# Patient Record
Sex: MALE | Race: WHITE | NOT HISPANIC OR LATINO
[De-identification: names, ages, dates, MRNs, and addresses within clinical notes are randomized per-mention and may not be internally consistent; named-entity substitution may affect disease eponyms.]

---

## 2017-04-28 ENCOUNTER — APPOINTMENT (OUTPATIENT)
Dept: PULMONOLOGY | Facility: CLINIC | Age: 62
End: 2017-04-28

## 2017-04-28 VITALS
HEART RATE: 96 BPM | TEMPERATURE: 98 F | HEIGHT: 65 IN | SYSTOLIC BLOOD PRESSURE: 90 MMHG | BODY MASS INDEX: 16.86 KG/M2 | OXYGEN SATURATION: 99 % | DIASTOLIC BLOOD PRESSURE: 70 MMHG | WEIGHT: 101.2 LBS

## 2017-05-17 ENCOUNTER — APPOINTMENT (OUTPATIENT)
Dept: ORTHOPEDIC SURGERY | Facility: CLINIC | Age: 62
End: 2017-05-17

## 2017-05-19 ENCOUNTER — APPOINTMENT (OUTPATIENT)
Dept: INTERNAL MEDICINE | Facility: CLINIC | Age: 62
End: 2017-05-19

## 2017-05-19 VITALS
WEIGHT: 102 LBS | BODY MASS INDEX: 17 KG/M2 | HEART RATE: 102 BPM | DIASTOLIC BLOOD PRESSURE: 66 MMHG | SYSTOLIC BLOOD PRESSURE: 101 MMHG | HEIGHT: 65 IN | OXYGEN SATURATION: 98 % | RESPIRATION RATE: 14 BRPM | TEMPERATURE: 98.5 F

## 2017-05-19 DIAGNOSIS — R56.9 UNSPECIFIED CONVULSIONS: ICD-10-CM

## 2017-05-19 DIAGNOSIS — K21.9 GASTRO-ESOPHAGEAL REFLUX DISEASE W/OUT ESOPHAGITIS: ICD-10-CM

## 2017-08-03 ENCOUNTER — CHART COPY (OUTPATIENT)
Age: 62
End: 2017-08-03

## 2017-12-01 ENCOUNTER — MESSAGE (OUTPATIENT)
Age: 62
End: 2017-12-01

## 2017-12-15 ENCOUNTER — APPOINTMENT (OUTPATIENT)
Dept: PHYSICAL MEDICINE AND REHAB | Facility: CLINIC | Age: 62
End: 2017-12-15

## 2018-10-04 ENCOUNTER — APPOINTMENT (OUTPATIENT)
Dept: PULMONOLOGY | Facility: CLINIC | Age: 63
End: 2018-10-04
Payer: MEDICAID

## 2018-10-04 VITALS
BODY MASS INDEX: 17 KG/M2 | HEIGHT: 65 IN | OXYGEN SATURATION: 99 % | DIASTOLIC BLOOD PRESSURE: 62 MMHG | HEART RATE: 81 BPM | TEMPERATURE: 98 F | SYSTOLIC BLOOD PRESSURE: 93 MMHG | WEIGHT: 102 LBS

## 2018-10-04 PROCEDURE — 99214 OFFICE O/P EST MOD 30 MIN: CPT

## 2018-10-05 ENCOUNTER — OTHER (OUTPATIENT)
Age: 63
End: 2018-10-05

## 2018-10-29 ENCOUNTER — APPOINTMENT (OUTPATIENT)
Dept: SLEEP CENTER | Facility: HOSPITAL | Age: 63
End: 2018-10-29
Payer: MEDICAID

## 2018-10-30 ENCOUNTER — OUTPATIENT (OUTPATIENT)
Dept: OUTPATIENT SERVICES | Facility: HOSPITAL | Age: 63
LOS: 1 days | End: 2018-10-30
Payer: COMMERCIAL

## 2018-10-30 DIAGNOSIS — G47.33 OBSTRUCTIVE SLEEP APNEA (ADULT) (PEDIATRIC): ICD-10-CM

## 2018-10-30 PROCEDURE — 95810 POLYSOM 6/> YRS 4/> PARAM: CPT

## 2018-10-30 PROCEDURE — 95810 POLYSOM 6/> YRS 4/> PARAM: CPT | Mod: 26

## 2018-12-20 ENCOUNTER — APPOINTMENT (OUTPATIENT)
Dept: PULMONOLOGY | Facility: CLINIC | Age: 63
End: 2018-12-20
Payer: MEDICAID

## 2018-12-20 VITALS
BODY MASS INDEX: 17 KG/M2 | DIASTOLIC BLOOD PRESSURE: 55 MMHG | HEIGHT: 65 IN | OXYGEN SATURATION: 98 % | SYSTOLIC BLOOD PRESSURE: 92 MMHG | HEART RATE: 83 BPM | TEMPERATURE: 98.2 F | WEIGHT: 102 LBS

## 2018-12-20 DIAGNOSIS — G47.8 OTHER SLEEP DISORDERS: ICD-10-CM

## 2018-12-20 PROCEDURE — 99214 OFFICE O/P EST MOD 30 MIN: CPT

## 2018-12-21 PROBLEM — G47.8 NON-RESTORATIVE SLEEP: Status: ACTIVE | Noted: 2017-05-01

## 2018-12-28 ENCOUNTER — MOBILE ON CALL (OUTPATIENT)
Age: 63
End: 2018-12-28

## 2018-12-31 ENCOUNTER — APPOINTMENT (OUTPATIENT)
Dept: RHEUMATOLOGY | Facility: CLINIC | Age: 63
End: 2018-12-31
Payer: MEDICAID

## 2018-12-31 VITALS
SYSTOLIC BLOOD PRESSURE: 111 MMHG | HEART RATE: 83 BPM | BODY MASS INDEX: 17 KG/M2 | WEIGHT: 102 LBS | TEMPERATURE: 98.5 F | HEIGHT: 65 IN | DIASTOLIC BLOOD PRESSURE: 68 MMHG

## 2018-12-31 DIAGNOSIS — M62.838 OTHER MUSCLE SPASM: ICD-10-CM

## 2018-12-31 PROCEDURE — 99205 OFFICE O/P NEW HI 60 MIN: CPT

## 2018-12-31 RX ORDER — CYCLOBENZAPRINE HYDROCHLORIDE 5 MG/1
5 TABLET, FILM COATED ORAL
Qty: 30 | Refills: 0 | Status: COMPLETED | COMMUNITY
Start: 2017-04-19 | End: 2018-12-31

## 2019-01-07 ENCOUNTER — APPOINTMENT (OUTPATIENT)
Dept: RADIOLOGY | Facility: CLINIC | Age: 64
End: 2019-01-07
Payer: MEDICAID

## 2019-01-07 ENCOUNTER — OUTPATIENT (OUTPATIENT)
Dept: OUTPATIENT SERVICES | Facility: HOSPITAL | Age: 64
LOS: 1 days | End: 2019-01-07

## 2019-01-07 PROCEDURE — 73130 X-RAY EXAM OF HAND: CPT | Mod: 26,LT,RT

## 2019-01-07 PROCEDURE — 73110 X-RAY EXAM OF WRIST: CPT | Mod: 26,LT,RT

## 2019-01-07 PROCEDURE — 73030 X-RAY EXAM OF SHOULDER: CPT | Mod: 26,LT,RT

## 2019-01-14 ENCOUNTER — APPOINTMENT (OUTPATIENT)
Dept: RHEUMATOLOGY | Facility: CLINIC | Age: 64
End: 2019-01-14
Payer: MEDICAID

## 2019-01-14 VITALS
HEART RATE: 89 BPM | OXYGEN SATURATION: 100 % | BODY MASS INDEX: 17 KG/M2 | SYSTOLIC BLOOD PRESSURE: 89 MMHG | TEMPERATURE: 97.8 F | WEIGHT: 102 LBS | HEIGHT: 65 IN | DIASTOLIC BLOOD PRESSURE: 61 MMHG

## 2019-01-14 DIAGNOSIS — M35.3 POLYMYALGIA RHEUMATICA: ICD-10-CM

## 2019-01-14 DIAGNOSIS — M25.531 PAIN IN RIGHT WRIST: ICD-10-CM

## 2019-01-14 DIAGNOSIS — E55.9 VITAMIN D DEFICIENCY, UNSPECIFIED: ICD-10-CM

## 2019-01-14 DIAGNOSIS — M50.90 CERVICAL DISC DISORDER, UNSPECIFIED, UNSPECIFIED CERVICAL REGION: ICD-10-CM

## 2019-01-14 DIAGNOSIS — E78.5 HYPERLIPIDEMIA, UNSPECIFIED: ICD-10-CM

## 2019-01-14 PROCEDURE — 99214 OFFICE O/P EST MOD 30 MIN: CPT

## 2019-01-14 RX ORDER — ERGOCALCIFEROL 1.25 MG/1
1.25 MG CAPSULE ORAL
Qty: 4 | Refills: 4 | Status: ACTIVE | COMMUNITY
Start: 2019-01-14 | End: 1900-01-01

## 2019-01-14 NOTE — HISTORY OF PRESENT ILLNESS
[___ Week(s) Ago] : [unfilled] week(s) ago [FreeTextEntry1] : Follow up visit: 1/14/19 \par Has abnormal X-ray suggestive of inflammatory arthritis with erosions, likely psoriatic arthritis. \par Pt tried prednisone 20mg for 2 days only and self discontinued as did not tolerate for fatigue, previously reported upper ext pain/shoulder pain has improved on it's own. \par States he applies ? lotion for his psoriasis which is not helping. Has new Derm appt in 2 month. \par Few years ago he saw Rheumatologist, don't remember the location and place who recommended MTX for psoriatic arthritis. \par He had labs done on 01/8/19 with normal chem/LFTs. Mild anemia with Hg 13.2/38 \par CRP 9 ( <5), ESR 22 Vit D 22, UA normal \par Hep B , C and Tb quantiferon: negative \par Mg, Phos and TSH: WNL \par \par \par Initial visit: \par pt comes for Rheum evaluation, he was seen by multiple Neurologists internal sensation of intermittent  quivering  and  tremor of the torso for past few years, which is getting worse,  he was seen by multiple neurologist, has radiculopathy, no other specific diagnosis has been made. \par Had palpitation , negative  cardiac work up (stress test, echo and holter was told has been normal)\par Reports generalized pain when sleeping down,  had normal sleep studies, \par hx of rotator cuff:  treated with PT/OT , no surgery requires. \par his major problems is b/l arm pain, associated with random part twitching. \par Muscle rigidity, muscle locked sensation, his neck and upper extremities are painful, feels stiff and unable to lift up both arms above 90 degree due to pain. \par ROS: positive for psoriasis of multiple skin selin, including extensor surface of the elbows, knees , shins and trunk, above umbilicus, not on any psoriatic treatment. \par

## 2019-01-14 NOTE — DATA REVIEWED
[FreeTextEntry1] : b/l hand and wrist X-ray 1/07/19 \par There is again cortical indistinctness at the distal phalanges bases, most \par pronounced in the first digit bilaterally, suggestive of early erosive \par disease. This is not significantly changed since exam 9/25/2015.. There is \par joint space narrowing at the interphalangeal joint of the first digit on the \par left. No significant soft tissue swelling. No fracture. Alignment within \par normal limits. \par \par \par IMPRESSION: \par Mild/early erosive disease as described above, possibly reflecting an \par inflammatory arthropathy which could include psoriatic arthritis. \par \par \par 3 views of each wrist are submitted. There is interval progression of \par erosive changes at the ulnar styloid bilaterally and mild periostitis. There \par is again adjacent soft tissue swelling. Carpal alignment is within normal \par limits. No fracture. \par \par \par IMPRESSION: \par Interval progression of erosive changes of the ulnar styloid laterally since \par 9/25/2015. Again, these may be related to inflammatory arthritis. \par \par

## 2019-01-14 NOTE — REVIEW OF SYSTEMS
[Feeling Poorly] : feeling poorly [Feeling Tired] : feeling tired [Negative] : ENT [Fever] : no fever [Chills] : no chills [Eye Pain] : no eye pain [Red Eyes] : eyes not red [Eyesight Problems] : no eyesight problems [Shortness Of Breath] : no shortness of breath [Cough] : no cough [SOB on Exertion] : no shortness of breath during exertion [Abdominal Pain] : no abdominal pain [Vomiting] : no vomiting [Constipation] : no constipation [Diarrhea] : no diarrhea [Easy Bleeding] : no tendency for easy bleeding [Easy Bruising] : no tendency for easy bruising [Swollen Glands] : no swollen glands [de-identified] : active psoriasis  [de-identified] : tremor

## 2019-01-14 NOTE — ASSESSMENT
[FreeTextEntry1] : 64 y/o man with upper body pain and tremor for past few years, no specific diagnosis has been made, most recently he is experiencing pain and limited ROM of both upper extremities and worse cervical pain and stiffness, MRI 01/2018  osteophytes suggestive DJD and C6/C7 disc herniation,\par He has elevated inflammatory markers both ESR and CRP, mild normocytic anemia and repeat x-ray of hand and wrist with Interval progression of erosive changes of the ulnar styloid laterally and non significant erosive changes of digits, suspicious for psoriatic arthritis. \par On exam he has b/l prominent and tender ulnar styloid and possible R 2-3MCP synovitis. \par \par 1. Psoriatic arthritis: I had very detailed discussion about type of arthritis, progression of his erosions and irreversible damage he already had, no evidence of enthesitis or uveitis at present time, so would treat him with MTX , start 10mg po weekly and gradually uptitrate the dose.  \par MTX side effects discussed: \par Gastrointestinal problems, such as nausea, stomach upset, and loose stools\par Stomatitis or soreness of the mouth\par Abnormal liver chemistries, which are typically mild elevations in hepatic transaminases \par A macular punctate cutaneous eruption, which usually occurs on the extremities, often affecting the elbows and knees, but sparing the trunk\par Central nervous system symptoms, including headache, fatigue, malaise, or impaired ability to concentrate\par Alopecia\par Fever, which is drug-related, although fever can also occur due to infection \par Hematologic abnormalities, particularly macrocytosis, in addition to infrequent but severe myelosuppression \par MTX is an abortifacient that can also induce congenital anomalies if taken during pregnancy\par \par Pt is hesitant to start therapy and wants to discuss with his primary before he agrees. \par Also he has appt with Rehab and prefers to wait. \par I asked to bring if any new spine X-ray or MRI as if there is spondylitis 2/2 PsA he need to be treated with bioogics along with MTX. \par instructed to call me if any acute eye pain or redness. \par \par 2. Uncontrolled Psoriasis : pt applies some ointment ( unable to remember) , has dermatology appt in 2 month, since Dr. Agosto accepts his insurance I would let her see patient and  appt made for 01/17 to  start therapy for active psoriasis. \par 3. Muscle spasms: ck Mg, CK, Phospate, TFT all normal as well as Hep B, C and Tb quantiferon.\par 4. Vit D deficiency: Rx ergocalciferol 50.000IU sent to his pharmacy with refill.  \par 5. Called to PMD: Dr.Richard Russo [phone], he is on vacation, left all my call back numbers to discuss my plan of care, also will make sure pt is vaccinated for Flu, Pneumonia and zoster before starting MTX. \par \par f/u in 2 weeks

## 2019-01-14 NOTE — PHYSICAL EXAM
[General Appearance - Alert] : alert [Sclera] : the sclera and conjunctiva were normal [Extraocular Movements] : extraocular movements were intact [Hearing Threshold Finger Rub Not O'Brien] : hearing was normal [Oropharynx] : the oropharynx was normal [Neck Appearance] : the appearance of the neck was normal [Thyroid Nodule] : there were no palpable thyroid nodules [Respiration, Rhythm And Depth] : normal respiratory rhythm and effort [Auscultation Breath Sounds / Voice Sounds] : lungs were clear to auscultation bilaterally [Heart Rate And Rhythm] : heart rate was normal and rhythm regular [Heart Sounds] : normal S1 and S2 [Heart Sounds Gallop] : no gallops [Murmurs] : no murmurs [Heart Sounds Pericardial Friction Rub] : no pericardial rub [Edema] : there was no peripheral edema [Veins - Varicosity Changes] : there were no varicosital changes [Abdomen Soft] : soft [Abdomen Tenderness] : non-tender [] : no hepato-splenomegaly [No CVA Tenderness] : no ~M costovertebral angle tenderness [Abnormal Walk] : normal gait [Sensation] : the sensory exam was normal to light touch and pinprick [Motor Exam] : the motor exam was normal [Oriented To Time, Place, And Person] : oriented to person, place, and time [FreeTextEntry1] : no synovitis of any joints, b/l wrist with significant ulnar styloid and restricted ROM, mildy tender and swelling,1st MCP b/l tender, possible mild synovitis of 2-3 R MCP joint, no other joint tendernss or swelling.

## 2019-01-17 ENCOUNTER — APPOINTMENT (OUTPATIENT)
Dept: DERMATOLOGY | Facility: CLINIC | Age: 64
End: 2019-01-17
Payer: MEDICAID

## 2019-01-17 VITALS — DIASTOLIC BLOOD PRESSURE: 66 MMHG | SYSTOLIC BLOOD PRESSURE: 100 MMHG

## 2019-01-17 DIAGNOSIS — L98.9 DISORDER OF THE SKIN AND SUBCUTANEOUS TISSUE, UNSPECIFIED: ICD-10-CM

## 2019-01-17 DIAGNOSIS — Z91.89 OTHER SPECIFIED PERSONAL RISK FACTORS, NOT ELSEWHERE CLASSIFIED: ICD-10-CM

## 2019-01-17 PROCEDURE — 99203 OFFICE O/P NEW LOW 30 MIN: CPT

## 2019-01-17 NOTE — HISTORY OF PRESENT ILLNESS
[FreeTextEntry1] : psoriasis [de-identified] : 64 yo M referred by Dr. Asencio for psoriasis. Has had psoriasis on his skin for years. Treated with betamethasone and then calcipotriene which does not work as well. Has never treated with systemic agents but rheumatology has now recommended he start methotrexate for likely psoriatic arthritis. He is not sure if he wants to do this or if he should see orthopedic surgery. Reports occasional pain in wrists. Also with bumps on back and scalp that come and go.

## 2019-01-17 NOTE — PHYSICAL EXAM
[Alert] : alert [Oriented x 3] : ~L oriented x 3 [Well Nourished] : well nourished [Conjunctiva Non-injected] : conjunctiva non-injected [No Visual Lymphadenopathy] : no visual  lymphadenopathy [No Clubbing] : no clubbing [No Edema] : no edema [No Bromhidrosis] : no bromhidrosis [No Chromhidrosis] : no chromhidrosis [FreeTextEntry3] : well defined psoriatic plaques extensor elbows and knees and shins and umbilicus\par excoriations on upper back\par minimal scale in scalp, no papules

## 2019-01-17 NOTE — CONSULT LETTER
[Dear  ___] : Dear  [unfilled], [Consult Letter:] : I had the pleasure of evaluating your patient, [unfilled]. [Please see my note below.] : Please see my note below. [Consult Closing:] : Thank you very much for allowing me to participate in the care of this patient.  If you have any questions, please do not hesitate to contact me. [Sincerely,] : Sincerely, [FreeTextEntry3] : Veronica Agosto MD\par [facility]\par

## 2019-01-24 ENCOUNTER — APPOINTMENT (OUTPATIENT)
Dept: NEUROLOGY | Facility: CLINIC | Age: 64
End: 2019-01-24
Payer: MEDICAID

## 2019-01-24 VITALS
HEART RATE: 94 BPM | SYSTOLIC BLOOD PRESSURE: 96 MMHG | HEIGHT: 65 IN | TEMPERATURE: 97.8 F | WEIGHT: 99.5 LBS | BODY MASS INDEX: 16.58 KG/M2 | DIASTOLIC BLOOD PRESSURE: 59 MMHG | OXYGEN SATURATION: 100 %

## 2019-01-24 DIAGNOSIS — M79.601 PAIN IN RIGHT ARM: ICD-10-CM

## 2019-01-24 DIAGNOSIS — R20.0 ANESTHESIA OF SKIN: ICD-10-CM

## 2019-01-24 DIAGNOSIS — M79.602 PAIN IN RIGHT ARM: ICD-10-CM

## 2019-01-24 DIAGNOSIS — M62.830 MUSCLE SPASM OF BACK: ICD-10-CM

## 2019-01-24 PROCEDURE — 99215 OFFICE O/P EST HI 40 MIN: CPT

## 2019-01-28 ENCOUNTER — APPOINTMENT (OUTPATIENT)
Dept: RHEUMATOLOGY | Facility: CLINIC | Age: 64
End: 2019-01-28

## 2019-03-05 ENCOUNTER — APPOINTMENT (OUTPATIENT)
Dept: NEUROLOGY | Facility: CLINIC | Age: 64
End: 2019-03-05

## 2021-05-21 ENCOUNTER — APPOINTMENT (OUTPATIENT)
Dept: NEUROLOGY | Facility: CLINIC | Age: 66
End: 2021-05-21
Payer: MEDICAID

## 2021-05-21 VITALS
OXYGEN SATURATION: 98 % | WEIGHT: 99 LBS | TEMPERATURE: 98.1 F | SYSTOLIC BLOOD PRESSURE: 93 MMHG | HEIGHT: 65 IN | DIASTOLIC BLOOD PRESSURE: 60 MMHG | BODY MASS INDEX: 16.5 KG/M2 | HEART RATE: 84 BPM

## 2021-05-21 PROCEDURE — 99215 OFFICE O/P EST HI 40 MIN: CPT

## 2021-05-21 PROCEDURE — 99417 PROLNG OP E/M EACH 15 MIN: CPT

## 2021-05-21 RX ORDER — PREDNISONE 5 MG/1
5 TABLET ORAL
Qty: 120 | Refills: 0 | Status: DISCONTINUED | COMMUNITY
Start: 2018-12-31 | End: 2021-05-21

## 2021-05-21 RX ORDER — OMEPRAZOLE 20 MG/1
20 CAPSULE, DELAYED RELEASE ORAL DAILY
Qty: 30 | Refills: 3 | Status: DISCONTINUED | COMMUNITY
Start: 2017-04-19 | End: 2021-05-21

## 2021-05-21 NOTE — HISTORY OF PRESENT ILLNESS
[FreeTextEntry1] : Reason for consult: sensory disturbance\par \par HPI: TIAN CARROLL is a 66 year old man \par \par Pt reports he is here for "postural sensitivity". Laying down is uncomfortable - feels "uncomfortable", "soreness" and "pain" of muscles in the morning, pressure-like sensation in both arms, head, chest (saw cardiologist and "no issue with heart". Had EMG in 2018 read as "Active denervation seen at the C5-6 paraspinals only, suggesting possible radiculopathy, cannot further localize. Performer's Impression: Possibly very mild bilat median neuropathy afffecting only sensory fiber velocity. Also read as mild R ulnar demyelinating neuropathy at the elbow." MRI C spine had been done read as "C3/4 osteophytic ridge disc complex inc in size compared to prior, flattening ventral thecal sac, contib to severe R neuroforaminal narrowing. Mod L foraminal narrowing, C5/6 mild osteo ridge-disc complex , no flattening of thecal sac. Mild L foraminal narrowing, C6/7 mild ridge disc complex with superimposed paracentral disc herniation, no flattening of thecal sac. Mod foraminal narrowing bilat".\par Saw Dr. Perez for psoriatic arthritis, was recommended systemic therapy but never started it.\par Tried gabapentin but made him lethargic - he does not remember who prescribed this. \par Repeat EMG in 10/2020 read as definitive slowing of NC velocity of L ulnar nerve, acute and chronic denervation from bilateral c4-5-6-7 radiculopathies. \par Has seen pain in the remote past.\par \par ROS/Current Sx:\par 10 point ROS reviewed and scanned\par foggy head in AM\par poor sleep\par \par PMHX:\par psoriatic arthritis\par HLD\par cervical disc dz\par bursitis\par \par MEDS:\par omeprazole prn\par metoprolol prn\par lorazepam prn\par \par ALL: nkda\par \par SHx: NC\par \par FHx: NC\par \par Vitals:  mildly low BP, advised him to discuss with his PMD\par \par Exam:\par \par AO3.  Normally conversant.  Follows commands, names, and repeats.  Good attention.\par \par PERRL, VFF, EOMI, no nystagmus, face symmetric, TUP at midline.\par \par Motor: \par                                                 R:                               L:\par Del                                           5                                5\par Bi                                              5                               5\par Tri                                            5                               5\par Wrist Extensors                      5                               5\par Finger abductors                    5                               5\par                                         5                               5 \par \par HF                                           5                               5\par KE                                           5                               5\par KF                                           5                               5\par DF                                           5                               5\par PF                                           5                               5\par \par Tone                                       R                               L\par UE                                          0                                0 \par LE                                          0                                0\par \par Sensory                                RUE                      LUE                 RLE                LLE     \par LT                                           +                            +                      +                   +\par Vib                                          +                            +                      +                   +\par JPS                                         +                            +                      +                   +\par PP                                         +                            +                      +                   +\par Temp                                     +                            +                      +                   +\par \par Reflexes:\par                                              R                             L                            \par Biceps                                  2                             2\par BR                                        2                             2\par Triceps                                2                              2\par Pat                                        2                            2 \par AJ                                        2                             2\par \par TOES                                    F                            F\par \par \par Coordination:\par                                              R                             L                       \par FTN                                       0                             0 \par TERESO                                      0                            0\par HTS                                      0                             0 \par \par Other                                                                          \par  \par \par Gait: normal, can heel, toe, tandem\par \par                     Assistance: none\par \par \par AP: 65yo w/ c/o feeling uncomfortable while laying down, BL arm pain/pressure. History is a bit unclear. Regardless, I think that his pain is related to either cervical radiculopathy or, more likely, psoriatic arthritis. Neurological exam was entirely normal. Therefore, I do not believe that the patient has a primary neurological condition.\par \par all questions answered, education provided, management discussed at length.\par \par - f/u with ortho to discuss radiculopathy. he was interested in seeing spine specialist so referred. \par - f/u with rheum to discuss psoriatic arthritis\par - pain referral\par - agree with PT\par - RTC PRN\par \par \par

## 2021-08-20 ENCOUNTER — APPOINTMENT (OUTPATIENT)
Dept: ORTHOPEDIC SURGERY | Facility: CLINIC | Age: 66
End: 2021-08-20
Payer: MEDICAID

## 2021-08-20 VITALS — WEIGHT: 99 LBS | HEIGHT: 65 IN | RESPIRATION RATE: 16 BRPM | BODY MASS INDEX: 16.5 KG/M2

## 2021-08-20 PROCEDURE — 73140 X-RAY EXAM OF FINGER(S): CPT | Mod: RT,59

## 2021-08-20 PROCEDURE — 73130 X-RAY EXAM OF HAND: CPT | Mod: RT

## 2021-08-20 PROCEDURE — 99214 OFFICE O/P EST MOD 30 MIN: CPT

## 2021-08-20 PROCEDURE — 99204 OFFICE O/P NEW MOD 45 MIN: CPT

## 2021-09-02 ENCOUNTER — APPOINTMENT (OUTPATIENT)
Dept: ORTHOPEDIC SURGERY | Facility: CLINIC | Age: 66
End: 2021-09-02

## 2022-12-09 ENCOUNTER — APPOINTMENT (OUTPATIENT)
Dept: HEART AND VASCULAR | Facility: CLINIC | Age: 67
End: 2022-12-09

## 2022-12-09 ENCOUNTER — NON-APPOINTMENT (OUTPATIENT)
Age: 67
End: 2022-12-09

## 2022-12-09 VITALS
WEIGHT: 99 LBS | SYSTOLIC BLOOD PRESSURE: 108 MMHG | HEART RATE: 102 BPM | DIASTOLIC BLOOD PRESSURE: 70 MMHG | OXYGEN SATURATION: 100 % | BODY MASS INDEX: 16.5 KG/M2 | TEMPERATURE: 97.6 F | HEIGHT: 65 IN

## 2022-12-09 DIAGNOSIS — Z71.89 OTHER SPECIFIED COUNSELING: ICD-10-CM

## 2022-12-09 PROCEDURE — 93000 ELECTROCARDIOGRAM COMPLETE: CPT

## 2022-12-09 PROCEDURE — 99203 OFFICE O/P NEW LOW 30 MIN: CPT | Mod: 25

## 2022-12-09 NOTE — HISTORY OF PRESENT ILLNESS
[FreeTextEntry1] : Jenaro is a pleasant 67yr-old man who presents for an annual check in with cardiology. He previously saw a cardiologist in [place] who has transitioned to a different position. He wants to re-establish with a new care provider. He has previously seen Dr Rehman in our office in 2016 and has had above-mentioned cardiovascular work up which are all unremarkable.\par \par On review of his history, he denies having any chest pain, palpitations, shortness of breath, syncope/presyncope, orthopnea, leg swelling, claudication. \par Exercise: walks every day > 10 blocks a day briskly without having chest pain or sob\par Good variety of food. Rice with every meal. \par Currently unemployed.\par Not on any daily meds but will start taking vitamin D, B, Mg, fish oil supplements.\par Reflux at night when lies flat but never dyspneic.

## 2022-12-09 NOTE — REASON FOR VISIT
[Other: ____] : [unfilled] [FreeTextEntry1] : CV Data reviewed\par \par Stress echo 02/2016: Normal. LVEF 55%. Negative for ischemia.\par 5 day holter 02/2016: Rare atrial ectopy. No ventricular ectopy.\par TTE 12/2015: Normal. LVEF 55% \par \par Lipid panel 01/2021: LDL 79 Total Chol 218 HDL 62\par ECG 12/9/2022: Normal

## 2022-12-09 NOTE — PHYSICAL EXAM
[Well Developed] : well developed [Well Nourished] : well nourished [No Acute Distress] : no acute distress [Normal Conjunctiva] : normal conjunctiva [Normal Venous Pressure] : normal venous pressure [No Carotid Bruit] : no carotid bruit [Normal S1, S2] : normal S1, S2 [No Murmur] : no murmur [No Rub] : no rub [No Gallop] : no gallop [Clear Lung Fields] : clear lung fields [Good Air Entry] : good air entry [No Respiratory Distress] : no respiratory distress  [Soft] : abdomen soft [Non Tender] : non-tender [No Masses/organomegaly] : no masses/organomegaly [Normal Bowel Sounds] : normal bowel sounds [Normal Gait] : normal gait [No Edema] : no edema [No Cyanosis] : no cyanosis [No Clubbing] : no clubbing [No Varicosities] : no varicosities [Moves all extremities] : moves all extremities [No Focal Deficits] : no focal deficits [Normal Speech] : normal speech [Alert and Oriented] : alert and oriented [Normal memory] : normal memory [de-identified] : + psoriatic plaques

## 2023-01-17 ENCOUNTER — APPOINTMENT (OUTPATIENT)
Dept: DERMATOLOGY | Facility: CLINIC | Age: 68
End: 2023-01-17
Payer: MEDICAID

## 2023-01-17 DIAGNOSIS — D22.9 MELANOCYTIC NEVI, UNSPECIFIED: ICD-10-CM

## 2023-01-17 DIAGNOSIS — D18.01 HEMANGIOMA OF SKIN AND SUBCUTANEOUS TISSUE: ICD-10-CM

## 2023-01-17 DIAGNOSIS — L57.0 ACTINIC KERATOSIS: ICD-10-CM

## 2023-01-17 PROCEDURE — 17000 DESTRUCT PREMALG LESION: CPT

## 2023-01-17 PROCEDURE — 99204 OFFICE O/P NEW MOD 45 MIN: CPT | Mod: 25

## 2023-01-17 PROCEDURE — 17003 DESTRUCT PREMALG LES 2-14: CPT

## 2023-01-17 NOTE — HISTORY OF PRESENT ILLNESS
[FreeTextEntry1] : Psoriasis, CBE - NPA [de-identified] : PCP: TALITA MALDONADO\par \par TIAN CARROLL is a 67 year M who presents for evaluation of:\par \par # Moles\par No personal or family hx of skin cancer\par Concerned about bumps on his scalp\par \par # Psoriasis and psoriatic arthritis\par Last seen by Dr. Agosto and rheum in 2019\par Only ointment that helps skin disease is betamethasone\par Has not started systemic therapy for PsA; saw a rheum 6 months ago at [facility]\par Reports that options were discussed including biologics but he has been using diclofenac \par Also used to do physical therapy\par Has not followed up with rheumatologist\par

## 2023-01-17 NOTE — PHYSICAL EXAM
[Alert] : alert [Oriented x 3] : ~L oriented x 3 [Full Body Skin Exam Performed] : performed [FreeTextEntry3] : Rough gritty papules x 3 on scalp\par Small erythematous papules scattered\par Symmetric brown macules and papules. No ABCD features. No concerning features on dermoscopy\par Psoriasiform plaques on umbilicus and lower extremities\par R > L fingers with joint deformity\par Onycholysis, yellow discoloration and splinter hemorrhages of toenails

## 2023-01-17 NOTE — ASSESSMENT
[FreeTextEntry1] : # Psoriasis - mild cutaneous with\par # Psoriatic arthritis - active with significant joint damage\par - For skin disease: resume betamethasone ointment BID\par - Pt has seen multiple physicians in the past and has been hesitant about systemic therapy for arthritis\par - For joint disease, discussed with patient about importance of treating joint disease with systemic therapy as erosive changes and joint damage are permanent\par - Referral placed for rheumatology at [facility] \par \par # Actinic keratosis\par - We have discussed the nature and usual course of these lesions\par - Cryotherapy administered with 2 cycles to actinic keratoses (#3)\par - The procedure was well tolerated, without complication\par \par # Multiple nevi\par # Cherry angiomas\par - Benign on today's examination\par \par # Skin cancer screening\par - A complete skin exam was performed\par - No concerning lesions identified\par - Call for new or changing lesions\par - Repeat CBE in 12 months\par \par

## 2023-01-27 ENCOUNTER — NON-APPOINTMENT (OUTPATIENT)
Age: 68
End: 2023-01-27

## 2023-03-15 ENCOUNTER — APPOINTMENT (OUTPATIENT)
Dept: RHEUMATOLOGY | Facility: CLINIC | Age: 68
End: 2023-03-15
Payer: MEDICAID

## 2023-03-15 VITALS
HEIGHT: 65 IN | OXYGEN SATURATION: 99 % | SYSTOLIC BLOOD PRESSURE: 99 MMHG | DIASTOLIC BLOOD PRESSURE: 62 MMHG | BODY MASS INDEX: 16.66 KG/M2 | HEART RATE: 92 BPM | WEIGHT: 100 LBS | TEMPERATURE: 98 F

## 2023-03-15 DIAGNOSIS — M25.649 STIFFNESS OF UNSPECIFIED HAND, NOT ELSEWHERE CLASSIFIED: ICD-10-CM

## 2023-03-15 DIAGNOSIS — M25.642 STIFFNESS OF LEFT HAND, NOT ELSEWHERE CLASSIFIED: ICD-10-CM

## 2023-03-15 DIAGNOSIS — M25.641 STIFFNESS OF RIGHT HAND, NOT ELSEWHERE CLASSIFIED: ICD-10-CM

## 2023-03-15 PROCEDURE — 99204 OFFICE O/P NEW MOD 45 MIN: CPT

## 2023-03-15 RX ORDER — OMEPRAZOLE 40 MG/1
40 CAPSULE, DELAYED RELEASE ORAL
Qty: 30 | Refills: 5 | Status: DISCONTINUED | COMMUNITY
Start: 2017-05-19 | End: 2023-03-15

## 2023-03-15 RX ORDER — MUPIROCIN 20 MG/G
2 OINTMENT TOPICAL
Qty: 1 | Refills: 0 | Status: DISCONTINUED | COMMUNITY
Start: 2019-01-17 | End: 2023-03-15

## 2023-03-19 PROBLEM — M25.641 STIFFNESS OF FINGER JOINT OF RIGHT HAND: Status: ACTIVE | Noted: 2021-08-20

## 2023-03-19 PROBLEM — M25.649 STIFFNESS OF HAND JOINT: Status: ACTIVE | Noted: 2021-08-20

## 2023-03-19 PROBLEM — M25.642 STIFFNESS OF FINGER JOINT OF LEFT HAND: Status: ACTIVE | Noted: 2021-08-20

## 2023-03-19 NOTE — HISTORY OF PRESENT ILLNESS
[FreeTextEntry1] : 67 year old man referred for rheumatology evaluation\par Referred by orthopedist, previously evaluated by [facility] and rheumatologist\par Patient with psoriasis, diagnosed with psoriatic arthritis\par \par XRAY 1/2019\par mild erosive disease, possibly reflecting an inflammatory arthritis which could include\par right hand: Symptoms have been going on for a long time\par \par Right 2nd and 4th PIP, swelling with decreased flexion\par Did not want injectables or pills in the past\par \par No current medications other arnold betamethasone\par \par Has psoriasis on the  legs, arms, behind the ear\par worse in the cold\par mometasone topically once per week\par \par Discussed treatment options

## 2023-03-19 NOTE — PHYSICAL EXAM
[General Appearance - Alert] : alert [General Appearance - In No Acute Distress] : in no acute distress [General Appearance - Well-Appearing] : healthy appearing [Sclera] : the sclera and conjunctiva were normal [] : no respiratory distress [Respiration, Rhythm And Depth] : normal respiratory rhythm and effort [Exaggerated Use Of Accessory Muscles For Inspiration] : no accessory muscle use [Edema] : there was no peripheral edema [Abnormal Walk] : normal gait [Oriented To Time, Place, And Person] : oriented to person, place, and time [Impaired Insight] : insight and judgment were intact [Affect] : the affect was normal [FreeTextEntry1] : right hand: 2nd MCP, PIP, 3rd PIP wrist edema with decreased ROM

## 2023-03-19 NOTE — REVIEW OF SYSTEMS
[Arthralgias] : arthralgias [Joint Swelling] : joint swelling [Joint Stiffness] : joint stiffness [Skin Lesions] : skin lesion [Negative] : Heme/Lymph

## 2023-03-19 NOTE — DATA REVIEWED
[FreeTextEntry1] : X-ray of the hands bilaterally January 2019\par Again cortical indistinctness at the distal phalanges bases, most pronounced in the first digit bilaterally suggestive of early erosive disease.  This is not significantly changed since exam September 2015.  There is joint space narrowing at the interphalangeal joint of the first digit on the left.  No significant soft tissue swelling.  No fracture.  Alignment within normal limits.  Mild early erosive disease as described above possibly reflecting an inflammatory arthropathy which could include psoriatic arthritis.\par \par Chest x-ray February 2016 possible left upper lobe scarring otherwise clear lungs\par X-ray of the hands 2016 normal.\par

## 2023-03-19 NOTE — ASSESSMENT
[FreeTextEntry1] : 67-year-old man referred for rheumatology evaluation.  Patient with psoriatic arthritis predominantly of the hands and wrist, with psoriasis.  X-rays and clinical exam consistent with psoriatic arthritis. Patient hesitant about treatment options, discussed Biologics versus oral medication such as methotrexate, risks and benefits. Discussed trial of Otezla, discussed risks and benefits including but not limited to severe diarrhea, depression, headaches, rashes. Continue topicals as per dermatology.  Follow up in six weeks or sooner as needed. \par

## 2023-05-05 ENCOUNTER — APPOINTMENT (OUTPATIENT)
Dept: ORTHOPEDIC SURGERY | Facility: CLINIC | Age: 68
End: 2023-05-05
Payer: MEDICAID

## 2023-05-05 DIAGNOSIS — M19.049 PRIMARY OSTEOARTHRITIS, UNSPECIFIED HAND: ICD-10-CM

## 2023-05-05 PROCEDURE — 73130 X-RAY EXAM OF HAND: CPT | Mod: 50

## 2023-05-25 ENCOUNTER — APPOINTMENT (OUTPATIENT)
Dept: RHEUMATOLOGY | Facility: CLINIC | Age: 68
End: 2023-05-25
Payer: MEDICAID

## 2023-05-25 VITALS
DIASTOLIC BLOOD PRESSURE: 63 MMHG | HEART RATE: 95 BPM | HEIGHT: 65 IN | WEIGHT: 101 LBS | TEMPERATURE: 98.2 F | BODY MASS INDEX: 16.83 KG/M2 | OXYGEN SATURATION: 100 % | SYSTOLIC BLOOD PRESSURE: 93 MMHG

## 2023-05-25 DIAGNOSIS — L40.50 ARTHROPATHIC PSORIASIS, UNSPECIFIED: ICD-10-CM

## 2023-05-25 DIAGNOSIS — L40.9 PSORIASIS, UNSPECIFIED: ICD-10-CM

## 2023-05-25 PROCEDURE — 99214 OFFICE O/P EST MOD 30 MIN: CPT

## 2023-05-25 RX ORDER — KETOCONAZOLE 20.5 MG/ML
2 SHAMPOO, SUSPENSION TOPICAL
Qty: 1 | Refills: 3 | Status: DISCONTINUED | COMMUNITY
Start: 2019-01-17 | End: 2023-05-25

## 2023-05-25 RX ORDER — APREMILAST 30 MG/1
TABLET, FILM COATED ORAL
Qty: 60 | Refills: 0 | Status: DISCONTINUED | COMMUNITY
Start: 2023-03-19 | End: 2023-05-25

## 2023-05-25 RX ORDER — BETAMETHASONE DIPROPIONATE 0.5 MG/G
0.05 OINTMENT TOPICAL
Qty: 1 | Refills: 6 | Status: DISCONTINUED | COMMUNITY
Start: 2019-01-17 | End: 2023-05-25

## 2023-05-25 RX ORDER — APREMILAST 10-20-30MG
KIT ORAL
Qty: 1 | Refills: 0 | Status: DISCONTINUED | COMMUNITY
Start: 2023-03-19 | End: 2023-05-25

## 2023-05-26 PROBLEM — L40.50 PSORIATIC ARTHRITIS: Status: ACTIVE | Noted: 2019-01-14

## 2023-05-26 NOTE — HISTORY OF PRESENT ILLNESS
[FreeTextEntry1] : May 25, 2023\par Patient returns for follow up\par Patient has not started otezla to date, his brother picked up for him and will drop off to him\par Was seen by hand orthopedist, completed  xray at that time, recommended to continue PT\par Pain and swelling in the bilateral wrist\par mcp right 2nd-4th \par PIP enlargement 2nd-5th\par +psoriasis\par \par March 15, 2023\par 67 year old man referred for rheumatology evaluation\par Referred by orthopedist, previously evaluated by [facility] and rheumatologist\par Patient with psoriasis, diagnosed with psoriatic arthritis\par \par XRAY 1/2019\par mild erosive disease, possibly reflecting an inflammatory arthritis which could include\par right hand: Symptoms have been going on for a long time\par \par Right 2nd and 4th PIP, swelling with decreased flexion\par Did not want injectables or pills in the past\par \par No current medications other arnold betamethasone\par \par Has psoriasis on the  legs, arms, behind the ear\par worse in the cold\par mometasone topically once per week\par \par Discussed treatment options

## 2023-05-26 NOTE — PHYSICAL EXAM
[General Appearance - Alert] : alert [General Appearance - Well-Appearing] : healthy appearing [Sclera] : the sclera and conjunctiva were normal [] : no respiratory distress [Respiration, Rhythm And Depth] : normal respiratory rhythm and effort [Exaggerated Use Of Accessory Muscles For Inspiration] : no accessory muscle use [Abnormal Walk] : normal gait [Oriented To Time, Place, And Person] : oriented to person, place, and time [Impaired Insight] : insight and judgment were intact [FreeTextEntry1] : right hand: 2nd MCP, PIP, 3rd PIP wrist edema with decreased ROM

## 2023-05-26 NOTE — ASSESSMENT
[FreeTextEntry1] : 68-year-old man returns for rheumatology follow up.  Patient with psoriatic arthritis predominantly of the hands and wrist, with psoriasis.  X-rays and clinical exam consistent with psoriatic arthritis. Patient hesitant about treatment options, at last visit, discussed otezla, awaiting start when receives from family member.  Again discussed trial of Otezla, discussed risks and benefits including but not limited to severe diarrhea, depression, headaches, rashes. Continue topicals as per dermatology.  Follow up in six weeks or sooner as needed after starting otezla. \par

## 2024-08-23 ENCOUNTER — APPOINTMENT (OUTPATIENT)
Dept: ORTHOPEDIC SURGERY | Facility: CLINIC | Age: 69
End: 2024-08-23
Payer: MEDICAID

## 2024-08-23 VITALS — WEIGHT: 101 LBS | HEIGHT: 65 IN | BODY MASS INDEX: 16.83 KG/M2 | RESPIRATION RATE: 16 BRPM

## 2024-08-23 DIAGNOSIS — M25.642 STIFFNESS OF LEFT HAND, NOT ELSEWHERE CLASSIFIED: ICD-10-CM

## 2024-08-23 DIAGNOSIS — M25.641 STIFFNESS OF RIGHT HAND, NOT ELSEWHERE CLASSIFIED: ICD-10-CM

## 2024-08-23 PROCEDURE — 99204 OFFICE O/P NEW MOD 45 MIN: CPT

## 2024-08-23 NOTE — HISTORY OF PRESENT ILLNESS
[FreeTextEntry1] : Patient is returning for a follow up of bilateral, mostly right, hand stiffness, bilateral dorsal wrist pain.  Patient was instructed to do physical therapy on his last visit on 05/05/23 which he did not do.  Patient has a history of Dupuytren's disease and arthritis. He has tried wearing a splint which he only wore for a short time with no improvement.

## 2024-08-23 NOTE — ASSESSMENT
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 Treatment options were again discussed. I explained that the condition is not curative and that initial treatment his pain to help control her symptoms. I discussed activity modification, diminishing activities that exacerbate her symptoms (i.e. opening tight jars and overusing the fingers), anti-inflammatory medication/ointment, and Coban wrap for support. Wrist steroid injections were offered today but he declined. I explained that flare-ups and periods of symptom control are common.  He'll plan to follow up with me as needed. I gave the patient a prescription for OT

## 2024-08-23 NOTE — PHYSICAL EXAM
[de-identified] : Physical exam demonstrates the patient to be alert and oriented x 3 and capable of ambulation. The patient is well-developed and well-nourished in no apparent respiratory distress. The majority of the skin is intact bilaterally in the upper extremities without any bilateral elbow lymphadenopathy.  Evaluation of both elbows reveals full symmetric range of motion from full extension to 140 of flexion with full pronation and full supination.   The wrists have diminished symmetric range of motion bilaterally. Tender over the radiocarpal joint bilaterally. There is a negative Beltran's test bilaterally. There is no tenderness over the distal radial ulnar joint or TFCC and no evidence of instability bilaterally. There is no tenderness over the pisotriquetral joint, hamate hook, or CMC joints bilaterally. The patient is nontender over both scaphoids and anatomic snuffbox is bilaterally. There is no clubbing cyanosis or edema.  Diminished but near symmetric digital ROM. Terrell and Heberden's nodes in all digits bilaterally.  There is good capillary refill of the digits bilaterally. Sensation is intact to light touch bilaterally.